# Patient Record
Sex: MALE | Race: WHITE | Employment: FULL TIME | URBAN - METROPOLITAN AREA
[De-identification: names, ages, dates, MRNs, and addresses within clinical notes are randomized per-mention and may not be internally consistent; named-entity substitution may affect disease eponyms.]

---

## 2018-03-02 PROBLEM — N52.9 ERECTILE DYSFUNCTION, UNSPECIFIED ERECTILE DYSFUNCTION TYPE: Status: ACTIVE | Noted: 2018-03-02

## 2018-03-02 PROBLEM — F41.1 GAD (GENERALIZED ANXIETY DISORDER): Status: ACTIVE | Noted: 2018-03-02

## 2018-03-02 PROCEDURE — 84403 ASSAY OF TOTAL TESTOSTERONE: CPT | Performed by: INTERNAL MEDICINE

## 2018-11-12 ENCOUNTER — OFFICE VISIT (OUTPATIENT)
Dept: OCCUPATIONAL MEDICINE | Age: 23
End: 2018-11-12
Attending: PHYSICIAN ASSISTANT
Payer: OTHER MISCELLANEOUS

## 2018-11-12 ENCOUNTER — HOSPITAL ENCOUNTER (OUTPATIENT)
Dept: GENERAL RADIOLOGY | Age: 23
Discharge: HOME | End: 2018-11-12
Attending: PHYSICIAN ASSISTANT
Payer: OTHER MISCELLANEOUS

## 2018-11-12 DIAGNOSIS — S90.32XA CONTUSION OF LEFT FOOT: Primary | ICD-10-CM

## 2018-11-12 DIAGNOSIS — S90.32XA CONTUSION OF LEFT FOOT: ICD-10-CM

## 2018-11-12 PROCEDURE — 73630 X-RAY EXAM OF FOOT: CPT | Performed by: PHYSICIAN ASSISTANT

## 2018-11-21 ENCOUNTER — HOSPITAL ENCOUNTER (OUTPATIENT)
Dept: GENERAL RADIOLOGY | Age: 23
Discharge: HOME | End: 2018-11-21
Attending: PHYSICIAN ASSISTANT
Payer: OTHER MISCELLANEOUS

## 2018-11-21 ENCOUNTER — OFFICE VISIT (OUTPATIENT)
Dept: OCCUPATIONAL MEDICINE | Age: 23
End: 2018-11-21
Attending: PHYSICIAN ASSISTANT
Payer: OTHER MISCELLANEOUS

## 2018-11-21 DIAGNOSIS — S90.31XD CONTUSION OF RIGHT FOOT, SUBSEQUENT ENCOUNTER: ICD-10-CM

## 2018-11-21 DIAGNOSIS — S90.31XD CONTUSION OF RIGHT FOOT, SUBSEQUENT ENCOUNTER: Primary | ICD-10-CM

## 2018-11-21 PROCEDURE — 73630 X-RAY EXAM OF FOOT: CPT | Performed by: PHYSICIAN ASSISTANT

## 2021-01-08 ENCOUNTER — TELEMEDICINE (OUTPATIENT)
Dept: TELEHEALTH | Age: 26
End: 2021-01-08
Payer: COMMERCIAL

## 2021-01-08 DIAGNOSIS — Z20.822 SUSPECTED COVID-19 VIRUS INFECTION: Primary | ICD-10-CM

## 2021-01-08 PROCEDURE — 99213 OFFICE O/P EST LOW 20 MIN: CPT | Performed by: NURSE PRACTITIONER

## 2021-01-08 NOTE — PROGRESS NOTES
This is a telemedicine visit with live, interactive video and audio. Patient understands and accepts financial responsibility for any deductible, co-insurance and/or co-pays associated with this service.     SUBJECTIVE  \"I think I need a Covid test\" below concerning comfort measures and telephone contact numbers. ASSESSMENT & PLAN  Diagnoses and all orders for this visit:    Suspected COVID-19 virus infection  -     SARS-COV-2 BY PCR ();  Future      Dear Saumya Knutson    Thank you for for headaches    Warm salt water gargles multiple times per day for sore throat    May consider OTC Cepacol as needed and directed on packing for sore throat    May consider OTC tylenol as needed and directed on packaging for pain or fever relief.     May c Sincerely,       7 Minutes of Time was accrued on this video visit.    RADHA Lua

## 2021-01-08 NOTE — PATIENT INSTRUCTIONS
Dear Marine Pritchett    Thank you for your e-visit. All e-visits are answered by nurse practitioners and physician assistants from the walk-in clinic. The COVID-19 can cause mild to severe symptoms.  Typically with fever, cough, and shortness of breath packaging for pain or fever relief. May consider OTC guaifenesin as needed and directed on packaging to thin mucus secretions.     May consider OTC dextromethorphan as needed and directed on packaging as a cough suppressant     May consider OTC phenyleph

## 2021-01-11 ENCOUNTER — LAB ENCOUNTER (OUTPATIENT)
Dept: LAB | Age: 26
End: 2021-01-11
Attending: NURSE PRACTITIONER
Payer: COMMERCIAL

## 2021-01-11 DIAGNOSIS — Z20.822 SUSPECTED COVID-19 VIRUS INFECTION: ICD-10-CM

## 2021-01-12 LAB — SARS-COV-2 RNA RESP QL NAA+PROBE: NOT DETECTED

## 2021-05-18 PROBLEM — S93.491A SPRAIN OF ANTERIOR TALOFIBULAR LIGAMENT OF RIGHT ANKLE, INITIAL ENCOUNTER: Status: ACTIVE | Noted: 2021-05-18

## 2022-07-12 ENCOUNTER — HOSPITAL ENCOUNTER (EMERGENCY)
Age: 27
Discharge: HOME | End: 2022-07-12
Attending: EMERGENCY MEDICINE
Payer: COMMERCIAL

## 2022-07-12 VITALS
WEIGHT: 184 LBS | HEIGHT: 70 IN | HEART RATE: 53 BPM | BODY MASS INDEX: 26.34 KG/M2 | SYSTOLIC BLOOD PRESSURE: 142 MMHG | OXYGEN SATURATION: 98 % | TEMPERATURE: 99 F | DIASTOLIC BLOOD PRESSURE: 74 MMHG | RESPIRATION RATE: 16 BRPM

## 2022-07-12 DIAGNOSIS — M60.9 MYOSITIS OF MULTIPLE SITES, UNSPECIFIED MYOSITIS TYPE: Primary | ICD-10-CM

## 2022-07-12 LAB
ALBUMIN SERPL-MCNC: 4.1 G/DL (ref 3.4–5)
ALBUMIN/GLOB SERPL: 1.3 {RATIO} (ref 1–2)
ALP LIVER SERPL-CCNC: 104 U/L
ALT SERPL-CCNC: 32 U/L
ANION GAP SERPL CALC-SCNC: 3 MMOL/L (ref 0–18)
AST SERPL-CCNC: 29 U/L (ref 15–37)
BASOPHILS # BLD AUTO: 0.05 X10(3) UL (ref 0–0.2)
BASOPHILS NFR BLD AUTO: 1.2 %
BILIRUB SERPL-MCNC: 0.4 MG/DL (ref 0.1–2)
BILIRUB UR QL STRIP.AUTO: NEGATIVE
BUN BLD-MCNC: 10 MG/DL (ref 7–18)
CALCIUM BLD-MCNC: 9.2 MG/DL (ref 8.5–10.1)
CHLORIDE SERPL-SCNC: 105 MMOL/L (ref 98–112)
CK SERPL-CCNC: 418 U/L
CLARITY UR REFRACT.AUTO: CLEAR
CO2 SERPL-SCNC: 27 MMOL/L (ref 21–32)
COLOR UR AUTO: YELLOW
CREAT BLD-MCNC: 0.98 MG/DL
EOSINOPHIL # BLD AUTO: 0.09 X10(3) UL (ref 0–0.7)
EOSINOPHIL NFR BLD AUTO: 2.1 %
ERYTHROCYTE [DISTWIDTH] IN BLOOD BY AUTOMATED COUNT: 11.9 %
GLOBULIN PLAS-MCNC: 3.2 G/DL (ref 2.8–4.4)
GLUCOSE BLD-MCNC: 99 MG/DL (ref 70–99)
GLUCOSE UR STRIP.AUTO-MCNC: NEGATIVE MG/DL
HCT VFR BLD AUTO: 45.6 %
HGB BLD-MCNC: 15.3 G/DL
IMM GRANULOCYTES # BLD AUTO: 0.01 X10(3) UL (ref 0–1)
IMM GRANULOCYTES NFR BLD: 0.2 %
KETONES UR STRIP.AUTO-MCNC: NEGATIVE MG/DL
LEUKOCYTE ESTERASE UR QL STRIP.AUTO: NEGATIVE
LYMPHOCYTES # BLD AUTO: 1.05 X10(3) UL (ref 1–4)
LYMPHOCYTES NFR BLD AUTO: 24.8 %
MCH RBC QN AUTO: 30 PG (ref 26–34)
MCHC RBC AUTO-ENTMCNC: 33.6 G/DL (ref 31–37)
MCV RBC AUTO: 89.4 FL
MONOCYTES # BLD AUTO: 0.73 X10(3) UL (ref 0.1–1)
MONOCYTES NFR BLD AUTO: 17.3 %
NEUTROPHILS # BLD AUTO: 2.3 X10 (3) UL (ref 1.5–7.7)
NEUTROPHILS # BLD AUTO: 2.3 X10(3) UL (ref 1.5–7.7)
NEUTROPHILS NFR BLD AUTO: 54.4 %
NITRITE UR QL STRIP.AUTO: NEGATIVE
OSMOLALITY SERPL CALC.SUM OF ELEC: 279 MOSM/KG (ref 275–295)
PH UR STRIP.AUTO: 7 [PH] (ref 5–8)
PLATELET # BLD AUTO: 139 10(3)UL (ref 150–450)
POTASSIUM SERPL-SCNC: 3.9 MMOL/L (ref 3.5–5.1)
PROT SERPL-MCNC: 7.3 G/DL (ref 6.4–8.2)
PROT UR STRIP.AUTO-MCNC: NEGATIVE MG/DL
RBC # BLD AUTO: 5.1 X10(6)UL
RBC UR QL AUTO: NEGATIVE
SARS-COV-2 RNA RESP QL NAA+PROBE: NOT DETECTED
SODIUM SERPL-SCNC: 135 MMOL/L (ref 136–145)
SP GR UR STRIP.AUTO: 1.02 (ref 1–1.03)
UROBILINOGEN UR STRIP.AUTO-MCNC: 0.2 MG/DL
WBC # BLD AUTO: 4.2 X10(3) UL (ref 4–11)

## 2022-07-12 PROCEDURE — 85025 COMPLETE CBC W/AUTO DIFF WBC: CPT | Performed by: EMERGENCY MEDICINE

## 2022-07-12 PROCEDURE — 80053 COMPREHEN METABOLIC PANEL: CPT | Performed by: EMERGENCY MEDICINE

## 2022-07-12 PROCEDURE — 96374 THER/PROPH/DIAG INJ IV PUSH: CPT

## 2022-07-12 PROCEDURE — 99284 EMERGENCY DEPT VISIT MOD MDM: CPT

## 2022-07-12 PROCEDURE — 96361 HYDRATE IV INFUSION ADD-ON: CPT

## 2022-07-12 PROCEDURE — 81003 URINALYSIS AUTO W/O SCOPE: CPT | Performed by: EMERGENCY MEDICINE

## 2022-07-12 PROCEDURE — 82550 ASSAY OF CK (CPK): CPT | Performed by: EMERGENCY MEDICINE

## 2022-07-12 RX ORDER — KETOROLAC TROMETHAMINE 30 MG/ML
30 INJECTION, SOLUTION INTRAMUSCULAR; INTRAVENOUS ONCE
Status: COMPLETED | OUTPATIENT
Start: 2022-07-12 | End: 2022-07-12

## 2022-10-09 ENCOUNTER — HOSPITAL ENCOUNTER (EMERGENCY)
Age: 27
Discharge: HOME | End: 2022-10-09
Attending: EMERGENCY MEDICINE
Payer: COMMERCIAL

## 2022-10-09 ENCOUNTER — APPOINTMENT (OUTPATIENT)
Dept: CT IMAGING | Age: 27
End: 2022-10-09
Attending: NURSE PRACTITIONER
Payer: COMMERCIAL

## 2022-10-09 ENCOUNTER — APPOINTMENT (OUTPATIENT)
Dept: CT IMAGING | Age: 27
End: 2022-10-09
Attending: EMERGENCY MEDICINE
Payer: COMMERCIAL

## 2022-10-09 VITALS
SYSTOLIC BLOOD PRESSURE: 127 MMHG | DIASTOLIC BLOOD PRESSURE: 75 MMHG | TEMPERATURE: 98 F | HEART RATE: 72 BPM | BODY MASS INDEX: 26 KG/M2 | WEIGHT: 180 LBS | OXYGEN SATURATION: 98 % | RESPIRATION RATE: 16 BRPM
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
## 2022-10-09 DIAGNOSIS — S06.0X0A CONCUSSION WITHOUT LOSS OF CONSCIOUSNESS, INITIAL ENCOUNTER: ICD-10-CM

## 2022-10-09 PROCEDURE — 96375 TX/PRO/DX INJ NEW DRUG ADDON: CPT

## 2022-10-09 PROCEDURE — 70498 CT ANGIOGRAPHY NECK: CPT | Performed by: EMERGENCY MEDICINE

## 2022-10-09 PROCEDURE — 72125 CT NECK SPINE W/O DYE: CPT | Performed by: NURSE PRACTITIONER

## 2022-10-09 PROCEDURE — 70450 CT HEAD/BRAIN W/O DYE: CPT | Performed by: NURSE PRACTITIONER

## 2022-10-09 PROCEDURE — 96374 THER/PROPH/DIAG INJ IV PUSH: CPT

## 2022-10-09 PROCEDURE — 70496 CT ANGIOGRAPHY HEAD: CPT | Performed by: EMERGENCY MEDICINE

## 2022-10-09 PROCEDURE — 99285 EMERGENCY DEPT VISIT HI MDM: CPT

## 2022-10-09 PROCEDURE — 99284 EMERGENCY DEPT VISIT MOD MDM: CPT

## 2022-10-09 RX ORDER — ONDANSETRON 4 MG/1
4 TABLET, ORALLY DISINTEGRATING ORAL EVERY 4 HOURS PRN
Qty: 10 TABLET | Refills: 0 | Status: SHIPPED | OUTPATIENT
Start: 2022-10-09 | End: 2022-10-16

## 2022-10-09 RX ORDER — MECLIZINE HYDROCHLORIDE 25 MG/1
25 TABLET ORAL 3 TIMES DAILY PRN
Qty: 15 TABLET | Refills: 0 | Status: SHIPPED | OUTPATIENT
Start: 2022-10-09 | End: 2022-10-12

## 2022-10-09 RX ORDER — KETOROLAC TROMETHAMINE 15 MG/ML
15 INJECTION, SOLUTION INTRAMUSCULAR; INTRAVENOUS ONCE
Status: COMPLETED | OUTPATIENT
Start: 2022-10-09 | End: 2022-10-09

## 2022-10-09 RX ORDER — ONDANSETRON 2 MG/ML
4 INJECTION INTRAMUSCULAR; INTRAVENOUS ONCE
Status: COMPLETED | OUTPATIENT
Start: 2022-10-09 | End: 2022-10-09

## 2024-07-31 ENCOUNTER — HOSPITAL ENCOUNTER (EMERGENCY)
Facility: HOSPITAL | Age: 29
Discharge: HOME | End: 2024-07-31
Attending: EMERGENCY MEDICINE
Payer: COMMERCIAL

## 2024-07-31 ENCOUNTER — APPOINTMENT (OUTPATIENT)
Dept: ULTRASOUND IMAGING | Facility: HOSPITAL | Age: 29
End: 2024-07-31
Attending: EMERGENCY MEDICINE
Payer: COMMERCIAL

## 2024-07-31 VITALS
SYSTOLIC BLOOD PRESSURE: 139 MMHG | HEART RATE: 48 BPM | BODY MASS INDEX: 24 KG/M2 | OXYGEN SATURATION: 99 % | DIASTOLIC BLOOD PRESSURE: 82 MMHG | TEMPERATURE: 99 F | WEIGHT: 170 LBS | RESPIRATION RATE: 16 BRPM

## 2024-07-31 DIAGNOSIS — L03.314 CELLULITIS OF GROIN: Primary | ICD-10-CM

## 2024-07-31 LAB
ALBUMIN SERPL-MCNC: 5 G/DL (ref 3.2–4.8)
ALBUMIN/GLOB SERPL: 1.9 {RATIO} (ref 1–2)
ALP LIVER SERPL-CCNC: 94 U/L
ALT SERPL-CCNC: 13 U/L
ANION GAP SERPL CALC-SCNC: 8 MMOL/L (ref 0–18)
AST SERPL-CCNC: 29 U/L (ref ?–34)
BASOPHILS # BLD AUTO: 0.04 X10(3) UL (ref 0–0.2)
BASOPHILS NFR BLD AUTO: 0.4 %
BILIRUB SERPL-MCNC: 1.2 MG/DL (ref 0.3–1.2)
BUN BLD-MCNC: 8 MG/DL (ref 9–23)
CALCIUM BLD-MCNC: 10 MG/DL (ref 8.7–10.4)
CHLORIDE SERPL-SCNC: 104 MMOL/L (ref 98–112)
CO2 SERPL-SCNC: 26 MMOL/L (ref 21–32)
CREAT BLD-MCNC: 0.87 MG/DL
EGFRCR SERPLBLD CKD-EPI 2021: 121 ML/MIN/1.73M2 (ref 60–?)
EOSINOPHIL # BLD AUTO: 0.04 X10(3) UL (ref 0–0.7)
EOSINOPHIL NFR BLD AUTO: 0.4 %
ERYTHROCYTE [DISTWIDTH] IN BLOOD BY AUTOMATED COUNT: 12.1 %
GLOBULIN PLAS-MCNC: 2.6 G/DL (ref 2–3.5)
GLUCOSE BLD-MCNC: 91 MG/DL (ref 70–99)
HCT VFR BLD AUTO: 46.1 %
HGB BLD-MCNC: 15.7 G/DL
IMM GRANULOCYTES # BLD AUTO: 0.04 X10(3) UL (ref 0–1)
IMM GRANULOCYTES NFR BLD: 0.4 %
LYMPHOCYTES # BLD AUTO: 1.24 X10(3) UL (ref 1–4)
LYMPHOCYTES NFR BLD AUTO: 12.9 %
MCH RBC QN AUTO: 31.1 PG (ref 26–34)
MCHC RBC AUTO-ENTMCNC: 34.1 G/DL (ref 31–37)
MCV RBC AUTO: 91.3 FL
MONOCYTES # BLD AUTO: 1.13 X10(3) UL (ref 0.1–1)
MONOCYTES NFR BLD AUTO: 11.8 %
NEUTROPHILS # BLD AUTO: 7.12 X10 (3) UL (ref 1.5–7.7)
NEUTROPHILS # BLD AUTO: 7.12 X10(3) UL (ref 1.5–7.7)
NEUTROPHILS NFR BLD AUTO: 74.1 %
OSMOLALITY SERPL CALC.SUM OF ELEC: 284 MOSM/KG (ref 275–295)
PLATELET # BLD AUTO: 155 10(3)UL (ref 150–450)
POTASSIUM SERPL-SCNC: 3.8 MMOL/L (ref 3.5–5.1)
PROT SERPL-MCNC: 7.6 G/DL (ref 5.7–8.2)
RBC # BLD AUTO: 5.05 X10(6)UL
SODIUM SERPL-SCNC: 138 MMOL/L (ref 136–145)
WBC # BLD AUTO: 9.6 X10(3) UL (ref 4–11)

## 2024-07-31 PROCEDURE — 93975 VASCULAR STUDY: CPT | Performed by: EMERGENCY MEDICINE

## 2024-07-31 PROCEDURE — 96375 TX/PRO/DX INJ NEW DRUG ADDON: CPT

## 2024-07-31 PROCEDURE — 76870 US EXAM SCROTUM: CPT | Performed by: EMERGENCY MEDICINE

## 2024-07-31 PROCEDURE — 99285 EMERGENCY DEPT VISIT HI MDM: CPT

## 2024-07-31 PROCEDURE — 85025 COMPLETE CBC W/AUTO DIFF WBC: CPT

## 2024-07-31 PROCEDURE — 80053 COMPREHEN METABOLIC PANEL: CPT

## 2024-07-31 PROCEDURE — 85025 COMPLETE CBC W/AUTO DIFF WBC: CPT | Performed by: EMERGENCY MEDICINE

## 2024-07-31 PROCEDURE — 80053 COMPREHEN METABOLIC PANEL: CPT | Performed by: EMERGENCY MEDICINE

## 2024-07-31 PROCEDURE — 99284 EMERGENCY DEPT VISIT MOD MDM: CPT

## 2024-07-31 PROCEDURE — 96374 THER/PROPH/DIAG INJ IV PUSH: CPT

## 2024-07-31 RX ORDER — CEPHALEXIN 500 MG/1
500 CAPSULE ORAL 4 TIMES DAILY
Qty: 28 CAPSULE | Refills: 0 | Status: SHIPPED | OUTPATIENT
Start: 2024-07-31 | End: 2024-08-07

## 2024-07-31 RX ORDER — HYDROCODONE BITARTRATE AND ACETAMINOPHEN 5; 325 MG/1; MG/1
1-2 TABLET ORAL EVERY 6 HOURS PRN
Qty: 10 TABLET | Refills: 0 | Status: SHIPPED | OUTPATIENT
Start: 2024-07-31 | End: 2024-08-05

## 2024-07-31 RX ORDER — NAPROXEN 500 MG/1
500 TABLET ORAL 2 TIMES DAILY PRN
Qty: 14 TABLET | Refills: 0 | Status: SHIPPED | OUTPATIENT
Start: 2024-07-31 | End: 2024-08-07

## 2024-07-31 RX ORDER — KETOROLAC TROMETHAMINE 15 MG/ML
15 INJECTION, SOLUTION INTRAMUSCULAR; INTRAVENOUS ONCE
Status: COMPLETED | OUTPATIENT
Start: 2024-07-31 | End: 2024-07-31

## 2024-07-31 RX ORDER — KETOROLAC TROMETHAMINE 30 MG/ML
30 INJECTION, SOLUTION INTRAMUSCULAR; INTRAVENOUS
COMMUNITY
Start: 2024-07-31 | End: 2024-07-31

## 2024-07-31 NOTE — ED INITIAL ASSESSMENT (HPI)
Patient endorses vasectomy 2 months ago, 3 weeks ago saw provider that did the procedure for pain. Pain went away for about 1 week. Pain is now back constant over the last 2 days. Pain worse in right testicle. Denies any fever. Both testicles are very swollen and tender per patient. Pain 8/10 was given Toradol injection at Urgent Care prior to coming today. Was advised to come to ED for evaluation. No thinners. Denies any discharge or bleeding.

## 2024-07-31 NOTE — CM/SW NOTE
Patient called states [facility] does not have norco requesting to change norco to [facility] in [place] on [address].  Dr Jiménez notified and changed script  Patient notified

## 2024-07-31 NOTE — DISCHARGE INSTRUCTIONS
Start antibiotics tomorrow.    Naproxen twice per day for pain.    Norco as needed for breakthrough pain.  May cause sedation/dizziness.  Do not use when drinking or driving

## 2024-07-31 NOTE — ED QUICK NOTES
Pt returns from US c/o pain 8-9/10. MD informed. Updated on POC. Mom at bedside. Call light within reach.

## 2024-07-31 NOTE — ED PROVIDER NOTES
Patient Seen in: [facility] Emergency Department      History     Chief Complaint   Patient presents with    Eval-G    Postop/Procedure Problem     Stated Complaint: Vasectomy two months ago - complications today.    Subjective:   HPI    28-year-old male presents for evaluation of scrotal pain.  Patient has had scrotal pain for the past 1 week.  More so on the left side.  Recent history is that the patient was treated for right-sided epididymitis by Dr. Macias 2 weeks ago with 1 week of Bactrim and steroids.  Patient was better for duration of treatment until symptoms recurred after stopping the antibiotics.  Patient had a vasectomy 2 months ago by Dr. Macias    Objective:   No pertinent past medical history.            No pertinent past surgical history.              No pertinent social history.            Review of Systems    Positive for stated Chief Complaint: Eval-G and Postop/Procedure Problem    Other systems are as noted in HPI.  Constitutional and vital signs reviewed.      All other systems reviewed and negative except as noted above.    Physical Exam     ED Triage Vitals [07/31/24 1258]   /78   Pulse 68   Resp 20   Temp 98.7 °F (37.1 °C)   Temp src Temporal   SpO2 97 %   O2 Device None (Room air)       Current Vitals:   Vital Signs  BP: 139/82  Pulse: (!) 48  Resp: 16  Temp: 98.7 °F (37.1 °C)  Temp src: Temporal  MAP (mmHg): 97    Oxygen Therapy  SpO2: 99 %  O2 Device: None (Room air)            Physical Exam    General: Alert, oriented, no apparent distress  HEENT:  Pupils equal reactive.  Extraocular motions intact. MMM.  Neck: Supple  Lungs: Clear to auscultation bilaterally.  Heart: Regular rate and rhythm.  Abdomen: Soft, nontender.   : Scrotal erythema and edema with tenderness behind the right testicle.  Skin: No rash.  No edema.  Neurologic: No focal neurologic deficits.  Normal speech pattern  Musculoskeletal: No tenderness or deformity noted.  Full range of motion  throughout.        ED Course     Labs Reviewed   COMP METABOLIC PANEL (14) - Abnormal; Notable for the following components:       Result Value    BUN 8 (*)     Albumin 5.0 (*)     All other components within normal limits   CBC W/ DIFFERENTIAL - Abnormal; Notable for the following components:    Monocyte Absolute 1.13 (*)     All other components within normal limits   CBC WITH DIFFERENTIAL WITH PLATELET    Narrative:     The following orders were created for panel order CBC With Differential With Platelet.  Procedure                               Abnormality         Status                     ---------                               -----------         ------                     CBC W/ DIFFERENTIAL[[id number]]          Abnormal            Final result                 Please view results for these tests on the individual orders.   URINALYSIS WITH CULTURE REFLEX   RAINBOW DRAW LAVENDER   RAINBOW DRAW LIGHT GREEN   RAINBOW DRAW BLUE                      MDM        Differential diagnosis includes orchitis, epididymitis, UTI, scrotal cellulitis    Chemistry and CBC unremarkable        US SCROTUM W/ DOPPLER (CPT=93975/57455)    Result Date: 7/31/2024  PROCEDURE:  US SCROTUM W/ DOPPLER (CPT=93975/74147)  COMPARISON:  None.  INDICATIONS:  Vasectomy two months ago, scrotal swelling and pain  TECHNIQUE:  Real time grey scale ultrasound was performed of the scrotal contents including the testicles, epididymis, spermatic cord, and scrotal wall. A duplex scan with B-mode, Doppler color flow, and spectral analysis were also performed.  PATIENT STATED HISTORY: (As transcribed by Technologist)  Post Vasectomy two months. Bilateral scrotal swelling, redness and pain for 1 day. Lump at right testicle    FINDINGS:  TESTES:  The right testicle measures 50 x 23 x 33 mm while the left measures 51 x 22 x 32 mm. Normal appearance with normal arterial and venous flow. EPIDIDYMIS:  14 mm left epididymal cyst.  The right epididymal head  measures up to 10 mm while the left measures up to 20 mm. HYDROCELE:  Negative VARICOCELE:  Negative OTHER:  Negative.            CONCLUSION:   1. No evidence of testicular torsion or epididymal orchitis.  2. 14 mm left epididymal cyst.   Please see above for further details.  LOCATION:  [facility]    Dictated by (CST): Dwaine Roger MD on 7/31/2024 at 4:33 PM     Finalized by (CST): Dwaine Roger MD on 7/31/2024 at 4:35 PM        Case discussed with Dr. Kelsey urology.  Recommends coverage as typical for cellulitis.  Keflex antibiotic prescribed.  Norco 10 tablets called to pharmacy.  Naproxen called to pharmacy.       Follow-up with Dr. Macias to recheck in 2 to 3 days.  Return here if redness or swelling progresses            Medical Decision Making      Disposition and Plan     Clinical Impression:  1. Cellulitis of groin         Disposition:  Discharge  7/31/2024  5:32 pm    Follow-up:  Stephon Verdin MD  [address] [place] [id number]  [phone]    Call in 2 day(s)  For wound re-check          Medications Prescribed:  Current Discharge Medication List        START taking these medications    Details   cephalexin 500 MG Oral Cap Take 1 capsule (500 mg total) by mouth 4 (four) times daily for 7 days.  Qty: 28 capsule, Refills: 0      HYDROcodone-acetaminophen 5-325 MG Oral Tab Take 1-2 tablets by mouth every 6 (six) hours as needed for Pain.  Qty: 10 tablet, Refills: 0    Associated Diagnoses: Cellulitis of groin      naproxen 500 MG Oral Tab Take 1 tablet (500 mg total) by mouth 2 (two) times daily as needed.  Qty: 14 tablet, Refills: 0

## 2025-06-04 ENCOUNTER — APPOINTMENT (OUTPATIENT)
Dept: GENERAL RADIOLOGY | Facility: HOSPITAL | Age: 30
End: 2025-06-04
Attending: EMERGENCY MEDICINE
Payer: OTHER MISCELLANEOUS

## 2025-06-04 ENCOUNTER — HOSPITAL ENCOUNTER (EMERGENCY)
Facility: HOSPITAL | Age: 30
Discharge: HOME | End: 2025-06-04
Attending: EMERGENCY MEDICINE
Payer: OTHER MISCELLANEOUS

## 2025-06-04 VITALS
DIASTOLIC BLOOD PRESSURE: 82 MMHG | BODY MASS INDEX: 24 KG/M2 | OXYGEN SATURATION: 98 % | TEMPERATURE: 98 F | HEART RATE: 90 BPM | WEIGHT: 170 LBS | RESPIRATION RATE: 18 BRPM | SYSTOLIC BLOOD PRESSURE: 132 MMHG

## 2025-06-04 DIAGNOSIS — S60.222A CONTUSION OF DORSUM OF LEFT HAND: Primary | ICD-10-CM

## 2025-06-04 PROCEDURE — 99284 EMERGENCY DEPT VISIT MOD MDM: CPT

## 2025-06-04 PROCEDURE — 73130 X-RAY EXAM OF HAND: CPT | Performed by: EMERGENCY MEDICINE

## 2025-06-04 PROCEDURE — 73110 X-RAY EXAM OF WRIST: CPT | Performed by: EMERGENCY MEDICINE

## 2025-06-04 RX ORDER — HYDROCODONE BITARTRATE AND ACETAMINOPHEN 5; 325 MG/1; MG/1
1 TABLET ORAL ONCE
Refills: 0 | Status: COMPLETED | OUTPATIENT
Start: 2025-06-04 | End: 2025-06-04

## 2025-06-04 RX ORDER — DICLOFENAC SODIUM 75 MG/1
75 TABLET, DELAYED RELEASE ORAL 2 TIMES DAILY
Qty: 20 TABLET | Refills: 0 | Status: SHIPPED | OUTPATIENT
Start: 2025-06-04

## 2025-06-04 NOTE — ED PROVIDER NOTES
Patient Seen in: [facility] Emergency Department        History  Chief Complaint   Patient presents with    Hand Injury    Trauma     Stated Complaint: L hand injury, pt stated that hand was sucked into a cylinder at work    Subjective:   HPI            29-year-old right-handed male presents with left hand injury.  He states it was caught in a cylinder at work.  Complains of pain and swelling along the dorsal aspect of the left hand pain radiates to the wrist.  No paresthesias.  No weakness.  No open lacerations.  No other complaints.      Objective:     No pertinent past medical history.            No pertinent past surgical history.              No pertinent social history.                              Physical Exam    ED Triage Vitals [06/04/25 0712]   BP (!) 144/99   Pulse 97   Resp 18   Temp 98 °F (36.7 °C)   Temp src Temporal   SpO2 97 %   O2 Device None (Room air)       Current Vitals:   Vital Signs  BP: (!) 144/99  Pulse: 97  Resp: 18  Temp: 98 °F (36.7 °C)  Temp src: Temporal    Oxygen Therapy  SpO2: 97 %  O2 Device: None (Room air)            Physical Exam  Vitals and nursing note reviewed.   Constitutional:       General: He is not in acute distress.     Appearance: He is well-developed. He is not toxic-appearing.   HENT:      Head: Normocephalic and atraumatic.   Eyes:      General: No scleral icterus.     Conjunctiva/sclera: Conjunctivae normal.   Cardiovascular:      Rate and Rhythm: Normal rate.   Pulmonary:      Effort: Pulmonary effort is normal. No respiratory distress.   Abdominal:      General: There is no distension.   Musculoskeletal:         General: Swelling and tenderness present.      Cervical back: Normal range of motion and neck supple.      Comments: Along the dorsal and radial aspect of the left hand.  Limited range of motion due to swelling and pain   Skin:     General: Skin is warm and dry.      Findings: No rash.      Comments: Abrasion noted to left hand   Neurological:       Mental Status: He is alert and oriented to person, place, and time.      Sensory: No sensory deficit.      Motor: No weakness or abnormal muscle tone.      Coordination: Coordination normal.   Psychiatric:         Behavior: Behavior normal.                ED Course  Labs Reviewed - No data to display                          MDM             -Comorbidities did add complexity to the management are mentioned in the HPI above        -I personally reviewed the prior external notes and the medical record to obtain additional history  -reviewed last ED visit July 31, 2024 he presented for cellulitis        -DDX: Includes but not limited to fracture, dislocation which is a medical condition that can pose a threat to life/function        -I personally reviewed the radiographs findings and they show no fracture or dislocation  Please refer to radiology report for official interpretation        Placed in a splint for comfort.  Recommended outpatient follow-up with employee health return to ER for any concerns or problems.  Discharged home stable condition          Medical Decision Making      Disposition and Plan     Clinical Impression:  1. Contusion of dorsum of left hand         Disposition:  Discharge  6/4/2025  8:29 am    Follow-up:  [facility]  [address] [place], [place] [address] Jean [address] [place] [address]  [phone]  Call in 1 day  Work related injury follow up          Medications Prescribed:  Current Discharge Medication List        START taking these medications    Details   diclofenac 75 MG Oral Tab EC Take 1 tablet (75 mg total) by mouth 2 (two) times daily.  Qty: 20 tablet, Refills: 0                   Supplementary Documentation:

## 2025-06-04 NOTE — ED INITIAL ASSESSMENT (HPI)
L hand injury, crushed with heavy metal cylinder while working [employer], pain with mobility to hand